# Patient Record
Sex: MALE | Race: WHITE | NOT HISPANIC OR LATINO | URBAN - METROPOLITAN AREA
[De-identification: names, ages, dates, MRNs, and addresses within clinical notes are randomized per-mention and may not be internally consistent; named-entity substitution may affect disease eponyms.]

---

## 2021-06-24 ENCOUNTER — OFFICE VISIT (OUTPATIENT)
Dept: URBAN - METROPOLITAN AREA CLINIC 118 | Facility: CLINIC | Age: 41
End: 2021-06-24
Payer: COMMERCIAL

## 2021-06-24 ENCOUNTER — DASHBOARD ENCOUNTERS (OUTPATIENT)
Age: 41
End: 2021-06-24

## 2021-06-24 VITALS
TEMPERATURE: 98.2 F | HEART RATE: 67 BPM | BODY MASS INDEX: 44.25 KG/M2 | DIASTOLIC BLOOD PRESSURE: 88 MMHG | HEIGHT: 68 IN | WEIGHT: 292 LBS | SYSTOLIC BLOOD PRESSURE: 130 MMHG

## 2021-06-24 DIAGNOSIS — R19.7 DIARRHEA, UNSPECIFIED TYPE: ICD-10-CM

## 2021-06-24 DIAGNOSIS — K63.2 INTESTINAL FISTULA: ICD-10-CM

## 2021-06-24 PROCEDURE — 99204 OFFICE O/P NEW MOD 45 MIN: CPT | Performed by: INTERNAL MEDICINE

## 2021-06-24 RX ORDER — GABAPENTIN 600 MG/1
1 TABLET TABLET, FILM COATED ORAL ONCE A DAY
Status: ACTIVE | COMMUNITY

## 2021-06-24 RX ORDER — METRONIDAZOLE 500 MG/1
1 TABLET TABLET ORAL THREE TIMES A DAY
Status: ACTIVE | COMMUNITY

## 2021-06-24 RX ORDER — CIPROFLOXACIN 500 MG/1
1 TABLET TABLET, FILM COATED ORAL
Status: ACTIVE | COMMUNITY

## 2021-06-24 RX ORDER — ATENOLOL 25 MG/1
AS DIRECTED TABLET ORAL
Status: ACTIVE | COMMUNITY

## 2021-06-24 RX ORDER — ALPRAZOLAM 1 MG/1
1 TABLET TABLET ORAL TWICE A DAY
Status: ACTIVE | COMMUNITY

## 2021-06-24 NOTE — HPI-TODAY'S VISIT:
40 yo M presents as hospital f/u. His mother helps to provide hx. Patient was seen at the [facility] on 6/21/2021 for fever/chills.  He was complaining of watery diarrhea since 6/17. CT abdomen and pelvis with evidence of mild inflammation of sigmoid colon associated with a tract/fistula between mid sigmoid and distal sigmoid colon, no focal collection.  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 Discharged on ciprofloxacin/metronidazole x10d. He was placed on azithromycin on 6/18 for bronchitis. Before, this last abx use in 12/2020 for pneumonia. Pt reports that diarrhea has cleared. BMs now formed, still multiple times per day. Denies GI blood loss, abd pain, N/V, unintentional wt loss. Denies fever since 6/22. Pt smokes cigarettes daily. No FH of colon cancer/IBD.

## 2021-06-26 LAB
C-REACTIVE PROTEIN, QUANT: 17
FERRITIN, SERUM: 474
IRON BIND.CAP.(TIBC): 328
IRON SATURATION: 25
IRON: 82
UIBC: 246
VITAMIN B12: 903

## 2021-07-22 ENCOUNTER — TELEPHONE ENCOUNTER (OUTPATIENT)
Dept: URBAN - METROPOLITAN AREA CLINIC 118 | Facility: CLINIC | Age: 41
End: 2021-07-22

## 2021-07-22 ENCOUNTER — LAB OUTSIDE AN ENCOUNTER (OUTPATIENT)
Dept: URBAN - METROPOLITAN AREA CLINIC 118 | Facility: CLINIC | Age: 41
End: 2021-07-22

## 2021-07-30 ENCOUNTER — OFFICE VISIT (OUTPATIENT)
Dept: URBAN - METROPOLITAN AREA CLINIC 118 | Facility: CLINIC | Age: 41
End: 2021-07-30

## 2021-07-30 PROBLEM — 62315008: Status: ACTIVE | Noted: 2021-06-24

## 2021-07-30 PROBLEM — 38851006: Status: ACTIVE | Noted: 2021-06-24

## 2021-08-13 ENCOUNTER — TELEPHONE ENCOUNTER (OUTPATIENT)
Dept: URBAN - METROPOLITAN AREA CLINIC 92 | Facility: CLINIC | Age: 41
End: 2021-08-13

## 2021-08-13 ENCOUNTER — LAB OUTSIDE AN ENCOUNTER (OUTPATIENT)
Dept: URBAN - METROPOLITAN AREA CLINIC 92 | Facility: CLINIC | Age: 41
End: 2021-08-13

## 2021-08-13 ENCOUNTER — OFFICE VISIT (OUTPATIENT)
Dept: URBAN - METROPOLITAN AREA SURGERY CENTER 23 | Facility: SURGERY CENTER | Age: 41
End: 2021-08-13
Payer: COMMERCIAL

## 2021-08-13 DIAGNOSIS — R93.3 ABN FINDINGS-GI TRACT: ICD-10-CM

## 2021-08-13 DIAGNOSIS — K63.89 BACTERIAL OVERGROWTH SYNDROME: ICD-10-CM

## 2021-08-13 PROCEDURE — 45380 COLONOSCOPY AND BIOPSY: CPT | Performed by: INTERNAL MEDICINE

## 2021-08-13 PROCEDURE — G8907 PT DOC NO EVENTS ON DISCHARG: HCPCS | Performed by: INTERNAL MEDICINE

## 2021-08-13 RX ORDER — ATENOLOL 25 MG/1
AS DIRECTED TABLET ORAL
Status: ACTIVE | COMMUNITY

## 2021-08-13 RX ORDER — METRONIDAZOLE 500 MG/1
1 TABLET TABLET ORAL THREE TIMES A DAY
Status: ACTIVE | COMMUNITY

## 2021-08-13 RX ORDER — CIPROFLOXACIN 500 MG/1
1 TABLET TABLET, FILM COATED ORAL
Status: ACTIVE | COMMUNITY

## 2021-08-13 RX ORDER — ALPRAZOLAM 1 MG/1
1 TABLET TABLET ORAL TWICE A DAY
Status: ACTIVE | COMMUNITY

## 2021-08-13 RX ORDER — GABAPENTIN 600 MG/1
1 TABLET TABLET, FILM COATED ORAL ONCE A DAY
Status: ACTIVE | COMMUNITY